# Patient Record
Sex: FEMALE | Race: BLACK OR AFRICAN AMERICAN | URBAN - METROPOLITAN AREA
[De-identification: names, ages, dates, MRNs, and addresses within clinical notes are randomized per-mention and may not be internally consistent; named-entity substitution may affect disease eponyms.]

---

## 2021-12-19 ENCOUNTER — OUT OF OFFICE VISIT (OUTPATIENT)
Dept: URBAN - METROPOLITAN AREA MEDICAL CENTER 26 | Facility: MEDICAL CENTER | Age: 31
End: 2021-12-19
Payer: COMMERCIAL

## 2021-12-19 DIAGNOSIS — K80.20 ASYMPTOMATIC CHOLELITHIASIS: ICD-10-CM

## 2021-12-19 DIAGNOSIS — R74.01 ABNORMAL/ELEVATED TRANSAMINASE (SGOT, AMINOTRANSFERASE): ICD-10-CM

## 2021-12-19 DIAGNOSIS — D50.9 ANEMIA: ICD-10-CM

## 2021-12-19 DIAGNOSIS — R10.11 ABDOMINAL BURNING SENSATION IN RIGHT UPPER QUADRANT: ICD-10-CM

## 2021-12-19 PROCEDURE — G8427 DOCREV CUR MEDS BY ELIG CLIN: HCPCS | Performed by: INTERNAL MEDICINE

## 2021-12-19 PROCEDURE — 99222 1ST HOSP IP/OBS MODERATE 55: CPT | Performed by: INTERNAL MEDICINE

## 2021-12-20 ENCOUNTER — OUT OF OFFICE VISIT (OUTPATIENT)
Dept: URBAN - METROPOLITAN AREA MEDICAL CENTER 26 | Facility: MEDICAL CENTER | Age: 31
End: 2021-12-20
Payer: COMMERCIAL

## 2021-12-20 DIAGNOSIS — R10.11 ABDOMINAL BURNING SENSATION IN RIGHT UPPER QUADRANT: ICD-10-CM

## 2021-12-20 DIAGNOSIS — E80.6 ABNORMAL BILIRUBIN TEST: ICD-10-CM

## 2021-12-20 DIAGNOSIS — D50.9 ANEMIA: ICD-10-CM

## 2021-12-20 DIAGNOSIS — K81.0 ACALCULOUS CHOLECYSTITIS: ICD-10-CM

## 2021-12-20 DIAGNOSIS — K80.20 ASYMPTOMATIC CHOLELITHIASIS: ICD-10-CM

## 2021-12-20 PROCEDURE — 99232 SBSQ HOSP IP/OBS MODERATE 35: CPT | Performed by: INTERNAL MEDICINE

## 2021-12-21 ENCOUNTER — OUT OF OFFICE VISIT (OUTPATIENT)
Dept: URBAN - METROPOLITAN AREA MEDICAL CENTER 26 | Facility: MEDICAL CENTER | Age: 31
End: 2021-12-21
Payer: COMMERCIAL

## 2021-12-21 DIAGNOSIS — R10.11 ABDOMINAL BURNING SENSATION IN RIGHT UPPER QUADRANT: ICD-10-CM

## 2021-12-21 DIAGNOSIS — K80.20 ASYMPTOMATIC CHOLELITHIASIS: ICD-10-CM

## 2021-12-21 DIAGNOSIS — D50.9 ANEMIA: ICD-10-CM

## 2021-12-21 PROCEDURE — 99232 SBSQ HOSP IP/OBS MODERATE 35: CPT | Performed by: INTERNAL MEDICINE

## 2021-12-24 ENCOUNTER — OUT OF OFFICE VISIT (OUTPATIENT)
Dept: URBAN - METROPOLITAN AREA MEDICAL CENTER 26 | Facility: MEDICAL CENTER | Age: 31
End: 2021-12-24
Payer: COMMERCIAL

## 2021-12-24 DIAGNOSIS — K80.42 CALCULUS OF BILE DUCT WITH ACUTE CHOLECYSTITIS WITHOUT OBSTRUCTION: ICD-10-CM

## 2021-12-24 PROCEDURE — 99232 SBSQ HOSP IP/OBS MODERATE 35: CPT | Performed by: INTERNAL MEDICINE

## 2021-12-24 PROCEDURE — 43262 ENDO CHOLANGIOPANCREATOGRAPH: CPT | Performed by: INTERNAL MEDICINE

## 2021-12-24 PROCEDURE — 43264 ERCP REMOVE DUCT CALCULI: CPT | Performed by: INTERNAL MEDICINE

## 2022-02-11 ENCOUNTER — DASHBOARD ENCOUNTERS (OUTPATIENT)
Age: 32
End: 2022-02-11

## 2022-02-11 ENCOUNTER — OFFICE VISIT (OUTPATIENT)
Dept: URBAN - METROPOLITAN AREA CLINIC 82 | Facility: CLINIC | Age: 32
End: 2022-02-11
Payer: COMMERCIAL

## 2022-02-11 ENCOUNTER — WEB ENCOUNTER (OUTPATIENT)
Dept: URBAN - METROPOLITAN AREA CLINIC 82 | Facility: CLINIC | Age: 32
End: 2022-02-11

## 2022-02-11 DIAGNOSIS — R10.13 DYSPEPSIA: ICD-10-CM

## 2022-02-11 DIAGNOSIS — K29.60 BILE REFLUX GASTRITIS: ICD-10-CM

## 2022-02-11 DIAGNOSIS — K58.2 IRRITABLE BOWEL SYNDROME WITH BOTH CONSTIPATION AND DIARRHEA: ICD-10-CM

## 2022-02-11 PROBLEM — 10743008: Status: ACTIVE | Noted: 2022-02-11

## 2022-02-11 PROBLEM — 72950008: Status: ACTIVE | Noted: 2022-02-11

## 2022-02-11 PROBLEM — 162031009: Status: ACTIVE | Noted: 2022-02-11

## 2022-02-11 PROCEDURE — 99214 OFFICE O/P EST MOD 30 MIN: CPT | Performed by: INTERNAL MEDICINE

## 2022-02-11 RX ORDER — SUCRALFATE 1 G
1 TABLET ON AN EMPTY STOMACH TABLET ORAL TWICE A DAY
Qty: 60 TABLET | Refills: 0 | OUTPATIENT
Start: 2022-02-11 | End: 2022-03-13

## 2022-02-11 RX ORDER — OMEPRAZOLE 40 MG/1
1 CAPSULE 30 MINUTES BEFORE MORNING MEAL CAPSULE, DELAYED RELEASE ORAL ONCE A DAY
Qty: 30 | OUTPATIENT
Start: 2022-02-11

## 2022-02-11 NOTE — HPI-TODAY'S VISIT:
Patient is seen today for the follow-up.  She is accompanied by her significant other.  She was recently hospitalized at the [facility] for acute cholecystitis as well as choledocholithiasis with abnormal liver enzymes.  She underwent ERCP with stone removal.  She reports having intermittent nausea epigastric discomfort as well as heartburn sensation.  She is a trying to manage her symptoms with eating smaller meals.  She also reports having diarrhea alternating with constipation.  Denies any unintentional weight loss

## 2022-02-11 NOTE — PHYSICAL EXAM GASTROINTESTINAL
Abdomen , obese soft, nontender, nondistended , no guarding or rigidity , no masses palpable , normal bowel sounds , Liver and Spleen , no hepatomegaly present , no hepatosplenomegaly , liver nontender , spleen not palpable

## 2022-02-12 LAB
A/G RATIO: 1.3
ALBUMIN: 4.2
ALKALINE PHOSPHATASE: 90
ALT (SGPT): 10
AST (SGOT): 17
BASO (ABSOLUTE): 0
BASOS: 0
BILIRUBIN, TOTAL: 0.3
BUN/CREATININE RATIO: 13
BUN: 9
CALCIUM: 9.1
CARBON DIOXIDE, TOTAL: 22
CHLORIDE: 104
CREATININE: 0.7
EGFR IF AFRICN AM: 133
EGFR IF NONAFRICN AM: 115
EOS (ABSOLUTE): 0.2
EOS: 2
GLOBULIN, TOTAL: 3.3
GLUCOSE: 100
HEMATOCRIT: 30.7
HEMATOLOGY COMMENTS:: (no result)
HEMOGLOBIN: 8.7
IMMATURE CELLS: (no result)
IMMATURE GRANS (ABS): 0.1
IMMATURE GRANULOCYTES: 1
LYMPHS (ABSOLUTE): 3.5
LYMPHS: 38
MCH: 18.8
MCHC: 28.3
MCV: 66
MONOCYTES(ABSOLUTE): 0.5
MONOCYTES: 5
NEUTROPHILS (ABSOLUTE): 5.1
NEUTROPHILS: 54
NRBC: (no result)
PLATELETS: 478
POTASSIUM: 4.1
PROTEIN, TOTAL: 7.5
RBC: 4.64
RDW: 21.9
SODIUM: 140
WBC: 9.4

## 2022-02-18 ENCOUNTER — TELEPHONE ENCOUNTER (OUTPATIENT)
Dept: URBAN - METROPOLITAN AREA CLINIC 92 | Facility: CLINIC | Age: 32
End: 2022-02-18

## 2022-02-18 RX ORDER — IRON,FM,PS/FOLIC/B,C18/L.CASEI 130-1.25MG
1 CAPSULE BETWEEN MEALS CAPSULE ORAL ONCE A DAY
Qty: 30 CAPSULE | Refills: 3 | OUTPATIENT
Start: 2022-02-18

## 2024-05-31 NOTE — PHYSICAL EXAM EYES:
Conjuntivae and eyelids appear normal, Sclerae : White without injection
[de-identified] : LYDIA is a 48 year old female presenting for her 3rd monthly weight check prior to bariatric surgery. Continuing to moderate portion sizes and make more healthful choices.  Increasing activity levels as much as possible.  Since her last visit, she underwent sleep study, which showed severe obstructive sleep apnea, and she is awaiting receipt of her CPAP machine. In addition, she underwent upper endoscopy, which demonstrated pathologic evidence of Cruz's esophagus, without dysplasia. She is here today to discuss undergoing gastric bypass instead of sleeve gastrectomy, in light of endoscopy findings.